# Patient Record
Sex: FEMALE | Race: OTHER | NOT HISPANIC OR LATINO | URBAN - METROPOLITAN AREA
[De-identification: names, ages, dates, MRNs, and addresses within clinical notes are randomized per-mention and may not be internally consistent; named-entity substitution may affect disease eponyms.]

---
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
## 2024-02-19 ENCOUNTER — EMERGENCY (EMERGENCY)
Facility: HOSPITAL | Age: 19
LOS: 1 days | Discharge: HOME | End: 2024-02-19
Attending: STUDENT IN AN ORGANIZED HEALTH CARE EDUCATION/TRAINING PROGRAM | Admitting: STUDENT IN AN ORGANIZED HEALTH CARE EDUCATION/TRAINING PROGRAM
Payer: MEDICAID

## 2024-02-19 VITALS
SYSTOLIC BLOOD PRESSURE: 167 MMHG | HEART RATE: 83 BPM | OXYGEN SATURATION: 100 % | TEMPERATURE: 98 F | DIASTOLIC BLOOD PRESSURE: 63 MMHG | RESPIRATION RATE: 18 BRPM

## 2024-02-19 VITALS
HEART RATE: 75 BPM | DIASTOLIC BLOOD PRESSURE: 61 MMHG | OXYGEN SATURATION: 100 % | TEMPERATURE: 98 F | RESPIRATION RATE: 18 BRPM | SYSTOLIC BLOOD PRESSURE: 105 MMHG

## 2024-02-19 LAB
ALBUMIN SERPL ELPH-MCNC: 4.4 G/DL — SIGNIFICANT CHANGE UP (ref 3.3–5)
ALP SERPL-CCNC: 72 U/L — SIGNIFICANT CHANGE UP (ref 40–120)
ALT FLD-CCNC: 10 U/L — SIGNIFICANT CHANGE UP (ref 4–33)
ANION GAP SERPL CALC-SCNC: 11 MMOL/L — SIGNIFICANT CHANGE UP (ref 7–14)
APPEARANCE UR: CLEAR — SIGNIFICANT CHANGE UP
AST SERPL-CCNC: 17 U/L — SIGNIFICANT CHANGE UP (ref 4–32)
BASOPHILS # BLD AUTO: 0.02 K/UL — SIGNIFICANT CHANGE UP (ref 0–0.2)
BASOPHILS NFR BLD AUTO: 0.1 % — SIGNIFICANT CHANGE UP (ref 0–2)
BILIRUB SERPL-MCNC: 0.8 MG/DL — SIGNIFICANT CHANGE UP (ref 0.2–1.2)
BILIRUB UR-MCNC: NEGATIVE — SIGNIFICANT CHANGE UP
BUN SERPL-MCNC: 11 MG/DL — SIGNIFICANT CHANGE UP (ref 7–23)
CALCIUM SERPL-MCNC: 9.4 MG/DL — SIGNIFICANT CHANGE UP (ref 8.4–10.5)
CHLORIDE SERPL-SCNC: 102 MMOL/L — SIGNIFICANT CHANGE UP (ref 98–107)
CO2 SERPL-SCNC: 22 MMOL/L — SIGNIFICANT CHANGE UP (ref 22–31)
COLOR SPEC: YELLOW — SIGNIFICANT CHANGE UP
CREAT SERPL-MCNC: 0.65 MG/DL — SIGNIFICANT CHANGE UP (ref 0.5–1.3)
DIFF PNL FLD: NEGATIVE — SIGNIFICANT CHANGE UP
EGFR: 129 ML/MIN/1.73M2 — SIGNIFICANT CHANGE UP
EOSINOPHIL # BLD AUTO: 0.01 K/UL — SIGNIFICANT CHANGE UP (ref 0–0.5)
EOSINOPHIL NFR BLD AUTO: 0.1 % — SIGNIFICANT CHANGE UP (ref 0–6)
GLUCOSE SERPL-MCNC: 100 MG/DL — HIGH (ref 70–99)
GLUCOSE UR QL: NEGATIVE MG/DL — SIGNIFICANT CHANGE UP
HCT VFR BLD CALC: 37.2 % — SIGNIFICANT CHANGE UP (ref 34.5–45)
HGB BLD-MCNC: 12.2 G/DL — SIGNIFICANT CHANGE UP (ref 11.5–15.5)
IANC: 13.73 K/UL — HIGH (ref 1.8–7.4)
IMM GRANULOCYTES NFR BLD AUTO: 0.4 % — SIGNIFICANT CHANGE UP (ref 0–0.9)
KETONES UR-MCNC: 80 MG/DL
LEUKOCYTE ESTERASE UR-ACNC: NEGATIVE — SIGNIFICANT CHANGE UP
LYMPHOCYTES # BLD AUTO: 1.31 K/UL — SIGNIFICANT CHANGE UP (ref 1–3.3)
LYMPHOCYTES # BLD AUTO: 8.3 % — LOW (ref 13–44)
MCHC RBC-ENTMCNC: 28.8 PG — SIGNIFICANT CHANGE UP (ref 27–34)
MCHC RBC-ENTMCNC: 32.8 GM/DL — SIGNIFICANT CHANGE UP (ref 32–36)
MCV RBC AUTO: 87.9 FL — SIGNIFICANT CHANGE UP (ref 80–100)
MONOCYTES # BLD AUTO: 0.71 K/UL — SIGNIFICANT CHANGE UP (ref 0–0.9)
MONOCYTES NFR BLD AUTO: 4.5 % — SIGNIFICANT CHANGE UP (ref 2–14)
NEUTROPHILS # BLD AUTO: 13.73 K/UL — HIGH (ref 1.8–7.4)
NEUTROPHILS NFR BLD AUTO: 86.6 % — HIGH (ref 43–77)
NITRITE UR-MCNC: NEGATIVE — SIGNIFICANT CHANGE UP
NRBC # BLD: 0 /100 WBCS — SIGNIFICANT CHANGE UP (ref 0–0)
NRBC # FLD: 0 K/UL — SIGNIFICANT CHANGE UP (ref 0–0)
PH UR: 6 — SIGNIFICANT CHANGE UP (ref 5–8)
PLATELET # BLD AUTO: 220 K/UL — SIGNIFICANT CHANGE UP (ref 150–400)
POTASSIUM SERPL-MCNC: 4 MMOL/L — SIGNIFICANT CHANGE UP (ref 3.5–5.3)
POTASSIUM SERPL-SCNC: 4 MMOL/L — SIGNIFICANT CHANGE UP (ref 3.5–5.3)
PROT SERPL-MCNC: 7.9 G/DL — SIGNIFICANT CHANGE UP (ref 6–8.3)
PROT UR-MCNC: NEGATIVE MG/DL — SIGNIFICANT CHANGE UP
RBC # BLD: 4.23 M/UL — SIGNIFICANT CHANGE UP (ref 3.8–5.2)
RBC # FLD: 12.7 % — SIGNIFICANT CHANGE UP (ref 10.3–14.5)
SODIUM SERPL-SCNC: 135 MMOL/L — SIGNIFICANT CHANGE UP (ref 135–145)
SP GR SPEC: 1.03 — SIGNIFICANT CHANGE UP (ref 1–1.03)
UROBILINOGEN FLD QL: 0.2 MG/DL — SIGNIFICANT CHANGE UP (ref 0.2–1)
WBC # BLD: 15.84 K/UL — HIGH (ref 3.8–10.5)
WBC # FLD AUTO: 15.84 K/UL — HIGH (ref 3.8–10.5)

## 2024-02-19 PROCEDURE — 99284 EMERGENCY DEPT VISIT MOD MDM: CPT

## 2024-02-19 PROCEDURE — 76830 TRANSVAGINAL US NON-OB: CPT | Mod: 26

## 2024-02-19 RX ORDER — KETOROLAC TROMETHAMINE 30 MG/ML
15 SYRINGE (ML) INJECTION ONCE
Refills: 0 | Status: DISCONTINUED | OUTPATIENT
Start: 2024-02-19 | End: 2024-02-19

## 2024-02-19 RX ADMIN — Medication 15 MILLIGRAM(S): at 09:57

## 2024-02-19 NOTE — ED PROVIDER NOTE - PATIENT PORTAL LINK FT
You can access the FollowMyHealth Patient Portal offered by Health system by registering at the following website: [email or web address]. By joining [employer]’s FollowMyHealth portal, you will also be able to view your health information using other applications (apps) compatible with our system.

## 2024-02-19 NOTE — ED PROVIDER NOTE - ATTENDING CONTRIBUTION TO CARE
17 yo F with h/o PCOS who presents to the ED c/o lower abdominal pain. She reports that the pain began last night, is mostly in the suprapubic region but radiates to both lower quadrants. No h/o similar pain in the past. No n/v/d, dysuria, hematuria, abnormal vaginal discharge, vaginal bleeding. On exam, she has suprapubic tenderness to palpation but abdomen is otherwise soft and non-tender, no CMT or adnexal tenderness on exam. Plan for labs, TVUS, toradol for pain control, re-assess

## 2024-02-19 NOTE — ED PROVIDER NOTE - OBJECTIVE STATEMENT
18-year-old female with history of PCOS presents to the ED with abdominal pain.  Patient notes that approximately 12 hours ago she had bilateral lower abdominal pain.  Pain is constant and not relieved with ibuprofen.  She went to sleep and was unable to sleep so she came to the ED.  LMP 2 weeks ago.  Has not had pain like this before.  Remote history of treated chlamydia approximately 4 years ago.  Sexually active with 1 male partner, on birth control does not use condoms.  No nausea or vomiting.  No vaginal discharge or bleeding

## 2024-02-19 NOTE — ED PROVIDER NOTE - PROGRESS NOTE DETAILS
Results reviewed  Labs and US without signs of acute pathology   small ovarian cyst - possible cause of sx   pain improved with toradol. on re-exam non-tender   GC/CT pending, follow-up order placed

## 2024-02-19 NOTE — ED PROVIDER NOTE - NS ED ROS FT
CONSTITUTIONAL: no fever or chills. No weakness    Eyes: No visual change  · ENMT: no runny nose or sore throat  · CARDIOVASCULAR: no chest pain   · RESPIRATORY:  no cough or  shortness of breath.  · GASTROINTESTINAL: +  abdominal pain, no nausea,  no diarrhea, no vomiting  · GENITOURINARY: no dysuria or increased urinary frequency  · MUSCULOSKELETAL: no back pain    NEURO: No weakness, no numbness/tingling   · ROS STATEMENT: all other ROS negative except as per HPI

## 2024-02-19 NOTE — ED PROVIDER NOTE - NSFOLLOWUPINSTRUCTIONS_ED_ALL_ED_FT
You were seen in the ER for pelvic pain    Your blood and urine tests were normal. You had an ovarian cyst on your ultrasound that may be the cause of your pain. The US did not show signs of infection or impaired blood flow    Take motrin 600mg every 6 hours for the next 3 days for pain. Although we are sending you home, no ER evaluation is complete without outpatient follow-up. Follow up with your regular doctor/[facility] within the next 1 week. Return to the ER for worsening symptoms, vaginal discharge, worsening pain

## 2024-02-19 NOTE — ED ADULT NURSE NOTE - OBJECTIVE STATEMENT
pt received to intake. pt is Axo 3 and ambulatory. pt c/o lower pelvic pain x yesterday. pt denies vaginal bleeding, burning on urination, hematuria, SOB, headaches, dizziness, n/v/d, or chest pain. Respirations even and unlabored. pt abdomen is soft, tender, and nondistended. pt endorse last menstrual cycle was x2 weeks ago. pt endorses new use of birth control. past medical hx of PCOS. Pt has a 20g to the right AC. pt labs obtained and sent. pt medicated as prescribed. plan of care ongoing.

## 2024-02-19 NOTE — ED PROVIDER NOTE - CLINICAL SUMMARY MEDICAL DECISION MAKING FREE TEXT BOX
18-year-old female with history of PCOS presents to the ED with lower abdominal pain.  Examination unremarkable.  Will evaluate for ovarian torsion, sexually transmitted infection, UTI.  If workup negative, symptoms may be due to PCOS.  Patient has OB/GYN to follow-up with.

## 2024-02-19 NOTE — ED PROVIDER NOTE - PHYSICAL EXAMINATION
CONSTITUTIONAL: Well appearing, awake, alert  ENMT: Airway patent, tolerating secretions.    NECK: Supple. No JVD  EYES: Clear bilaterally, pupils equal, round  CARDIAC: Warm, well-perfused  RESPIRATORY: Equal Chest Rise, no stridor. No respiratory distress.   GASTROINTESTINAL: Non-distended. mild bilateral lower quadrant and pelvic TTP  PELVIC: with Dr Marte. normal external genetelia. no bleeding or discharge in the vaginal vault. cervical os closed with no erythema. No CMT  MUSCULOSKELETAL: No gross joint deformity   NEUROLOGICAL: Alert. Speech Fluent. Attends to conversation and exam  SKIN:  No erythema    PSYCHIATRIC: Normal affect. Cooperative with history and exam

## 2024-02-20 LAB
C TRACH RRNA SPEC QL NAA+PROBE: SIGNIFICANT CHANGE UP
N GONORRHOEA RRNA SPEC QL NAA+PROBE: SIGNIFICANT CHANGE UP
N GONORRHOEA RRNA SPEC QL NAA+PROBE: SIGNIFICANT CHANGE UP
SPECIMEN SOURCE: SIGNIFICANT CHANGE UP
SPECIMEN SOURCE: SIGNIFICANT CHANGE UP